# Patient Record
Sex: FEMALE | Race: WHITE | Employment: UNEMPLOYED | URBAN - NONMETROPOLITAN AREA
[De-identification: names, ages, dates, MRNs, and addresses within clinical notes are randomized per-mention and may not be internally consistent; named-entity substitution may affect disease eponyms.]

---

## 2017-12-09 ENCOUNTER — OFFICE VISIT (OUTPATIENT)
Dept: URGENT CARE | Age: 57
End: 2017-12-09

## 2017-12-09 VITALS
SYSTOLIC BLOOD PRESSURE: 137 MMHG | BODY MASS INDEX: 23.22 KG/M2 | HEIGHT: 60 IN | OXYGEN SATURATION: 98 % | WEIGHT: 118.25 LBS | DIASTOLIC BLOOD PRESSURE: 83 MMHG | RESPIRATION RATE: 14 BRPM | HEART RATE: 68 BPM | TEMPERATURE: 98.9 F

## 2017-12-09 DIAGNOSIS — T07.XXXA MULTIPLE ABRASIONS: ICD-10-CM
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
## 2017-12-09 DIAGNOSIS — S09.93XA FACIAL INJURY, INITIAL ENCOUNTER: ICD-10-CM
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
## 2017-12-09 PROCEDURE — 99213 OFFICE O/P EST LOW 20 MIN: CPT | Performed by: NURSE PRACTITIONER

## 2017-12-09 ASSESSMENT — ENCOUNTER SYMPTOMS
ALLERGIC/IMMUNOLOGIC NEGATIVE: 1
EYES NEGATIVE: 1
RESPIRATORY NEGATIVE: 1
GASTROINTESTINAL NEGATIVE: 1

## 2017-12-09 NOTE — PATIENT INSTRUCTIONS
Patient Education        Scrapes (Abrasions): Care Instructions  Your Care Instructions  Scrapes (abrasions) are wounds where your skin has been rubbed or torn off. Most scrapes do not go deep into the skin, but some may remove several layers of skin. Scrapes usually don't bleed much, but they may ooze pinkish fluid. Scrapes on the head or face may appear worse than they are. They may bleed a lot because of the good blood supply to this area. Most scrapes heal well and may not need a bandage. They usually heal within 3 to 7 days. A large, deep scrape may take 1 to 2 weeks or longer to heal. A scab may form on some scrapes. Follow-up care is a key part of your treatment and safety. Be sure to make and go to all appointments, and call your doctor if you are having problems. It's also a good idea to know your test results and keep a list of the medicines you take. How can you care for yourself at home? · If your doctor told you how to care for your wound, follow your doctor's instructions. If you did not get instructions, follow this general advice:  ¨ Wash the scrape with clean water 2 times a day. Don't use hydrogen peroxide or alcohol, which can slow healing. ¨ You may cover the scrape with a thin layer of petroleum jelly, such as Vaseline, and a nonstick bandage. ¨ Apply more petroleum jelly and replace the bandage as needed. · Prop up the injured area on a pillow anytime you sit or lie down during the next 3 days. Try to keep it above the level of your heart. This will help reduce swelling. · Be safe with medicines. Take pain medicines exactly as directed. ¨ If the doctor gave you a prescription medicine for pain, take it as prescribed. ¨ If you are not taking a prescription pain medicine, ask your doctor if you can take an over-the-counter medicine. When should you call for help?   Call your doctor now or seek immediate medical care if:  ? · You have signs of infection, such as:  ¨ Increased pain, swelling, warmth, or redness around the scrape. ¨ Red streaks leading from the scrape. ¨ Pus draining from the scrape. ¨ A fever. ? · The scrape starts to bleed, and blood soaks through the bandage. Oozing small amounts of blood is normal.   ? Watch closely for changes in your health, and be sure to contact your doctor if the scrape is not getting better each day. Where can you learn more? Go to https://Messagemind.[employer]. org and sign in to your [employer] account. Enter A374 in the Com[employer]ence box to learn more about \"Scrapes (Abrasions): Care Instructions. \"     If you do not have an account, please click on the \"Sign Up Now\" link. Current as of: March 20, 2017  Content Version: 11.4  © 1689-6741 [employer]. Care instructions adapted under license by [facility] ([facility]). If you have questions about a medical condition or this instruction, always ask your healthcare professional. [place] [address] any warranty or liability for your use of this information.      Advised to apply ice to face for 10-15 minutes 3 to 4 times a day  Cleanse face twice daily and apply OTC Triple antibiotic ointment twice daily  Tylenol or Motrin for fever or discomfort  Follow up or see PCP for any increased swelling or nasal congestion

## 2017-12-09 NOTE — PROGRESS NOTES
[facility] CARE  [address] [place] [address] Agustin Benedict [address]  Dept: [phone]  Loc: [phone]    Urvashi Iniguez is a 62 y.o. female who presents today for her medical conditions/complaints as noted below. Urvashi Iniguez is c/o of Facial Injury (fell earlier today and scraped face)    She presents for facial abrasions after falling at home earlier today. States she was running back into the house and tripped on something and fell on her face. She states she cleaned her face and right hand/thumb and applied some Silver herbal medicine. She also states she took Tylenol about 1230pm today. She denies any nasal or face pain or nasal congestion. HPI:     HPI    No past medical history on file. Past Surgical History:   Procedure Laterality Date    ANKLE SURGERY      left       Family History   Problem Relation Age of Onset    Other Mother      RA       Social History   Substance Use Topics    Smoking status: Never Smoker    Smokeless tobacco: Never Used    Alcohol use No      Current Outpatient Prescriptions   Medication Sig Dispense Refill    Multiple Vitamins-Minerals (THERAPEUTIC MULTIVITAMIN-MINERALS) tablet Take 1 tablet by mouth daily       No current facility-administered medications for this visit. No Known Allergies    Health Maintenance   Topic Date Due    DTaP/Tdap/Td vaccine (1 - Tdap) 09/30/1979    Colon cancer screen colonoscopy  09/30/2010    Flu vaccine (1) 09/01/2017    Breast cancer screen  05/05/2018    Cervical cancer screen  05/03/2019    Lipid screen  05/06/2021    Hepatitis C screen  Addressed    HIV screen  Addressed       Subjective:      Review of Systems   Constitutional: Negative. HENT: Negative. Eyes: Negative. Respiratory: Negative. Cardiovascular: Negative. Gastrointestinal: Negative. Endocrine: Negative. Genitourinary: Negative. Musculoskeletal: Negative.     Skin: Positive for wound (face and right hand). Allergic/Immunologic: Negative. Neurological: Negative. Hematological: Negative. Psychiatric/Behavioral: Negative. Objective:     Physical Exam   Constitutional: She is oriented to person, place, and time. She appears well-developed and well-nourished. No distress. HENT:   Head: Normocephalic. Eyes: Right eye exhibits no discharge. Left eye exhibits no discharge. Neck: Normal range of motion. Neck supple. Pulmonary/Chest: Effort normal. She has no wheezes. Musculoskeletal: Normal range of motion. Neurological: She is alert and oriented to person, place, and time. Skin: Skin is warm. Abrasion (to right face x 4 and right hand/thumb) and ecchymosis (slight to nasal bridge) noted. No laceration noted. Rash: to abrasion areas. She is not diaphoretic. There is erythema. Psychiatric: She has a normal mood and affect. Her behavior is normal. Judgment and thought content normal.   Nursing note and vitals reviewed. /83   Pulse 68   Temp 98.9 °F (37.2 °C) (Temporal)   Resp 14   Ht 5' (1.524 m)   Wt 118 lb 4 oz (53.6 kg)   LMP 08/07/2013   SpO2 98%   BMI 23.09 kg/m²     Assessment:      1. Fall in home, initial encounter     2. Multiple abrasions     3. Facial injury, initial encounter         Plan:    No orders of the defined types were placed in this encounter. No Follow-up on file. No orders of the defined types were placed in this encounter. No orders of the defined types were placed in this encounter. Patient given educational materials - see patient instructions. Discussed use, benefit, and side effects of prescribed medications. All patient questions answered. Pt voiced understanding. Reviewed health maintenance. Instructed to continue current medications, diet and exercise. Patient agreed with treatment plan. Follow up as directed.      Patient Instructions       Patient Education        Scrapes (Abrasions): Care Instructions  Your Care Instructions  Scrapes (abrasions) are wounds where your skin has been rubbed or torn off. Most scrapes do not go deep into the skin, but some may remove several layers of skin. Scrapes usually don't bleed much, but they may ooze pinkish fluid. Scrapes on the head or face may appear worse than they are. They may bleed a lot because of the good blood supply to this area. Most scrapes heal well and may not need a bandage. They usually heal within 3 to 7 days. A large, deep scrape may take 1 to 2 weeks or longer to heal. A scab may form on some scrapes. Follow-up care is a key part of your treatment and safety. Be sure to make and go to all appointments, and call your doctor if you are having problems. It's also a good idea to know your test results and keep a list of the medicines you take. How can you care for yourself at home? · If your doctor told you how to care for your wound, follow your doctor's instructions. If you did not get instructions, follow this general advice:  ¨ Wash the scrape with clean water 2 times a day. Don't use hydrogen peroxide or alcohol, which can slow healing. ¨ You may cover the scrape with a thin layer of petroleum jelly, such as Vaseline, and a nonstick bandage. ¨ Apply more petroleum jelly and replace the bandage as needed. · Prop up the injured area on a pillow anytime you sit or lie down during the next 3 days. Try to keep it above the level of your heart. This will help reduce swelling. · Be safe with medicines. Take pain medicines exactly as directed. ¨ If the doctor gave you a prescription medicine for pain, take it as prescribed. ¨ If you are not taking a prescription pain medicine, ask your doctor if you can take an over-the-counter medicine. When should you call for help? Call your doctor now or seek immediate medical care if:  ? · You have signs of infection, such as:  ¨ Increased pain, swelling, warmth, or redness around the scrape.   ¨ Red streaks leading from the scrape. ¨ Pus draining from the scrape. ¨ A fever. ? · The scrape starts to bleed, and blood soaks through the bandage. Oozing small amounts of blood is normal.   ? Watch closely for changes in your health, and be sure to contact your doctor if the scrape is not getting better each day. Where can you learn more? Go to https://Kizoom.[employer]. org and sign in to your [employer] account. Enter A374 in the [facility] box to learn more about \"Scrapes (Abrasions): Care Instructions. \"     If you do not have an account, please click on the \"Sign Up Now\" link. Current as of: March 20, 2017  Content Version: 11.4  © 8502-8104 [email or web address]. Care instructions adapted under license by [facility] ([facility]). If you have questions about a medical condition or this instruction, always ask your healthcare professional. [place] [address] any warranty or liability for your use of this information.      Advised to apply ice to face for 10-15 minutes 3 to 4 times a day  Cleanse face twice daily and apply OTC Triple antibiotic ointment twice daily  Tylenol or Motrin for fever or discomfort  Follow up or see PCP for any increased swelling or nasal congestion        Electronically signed by LYNNE George on 12/9/2017 at 2:01 PM

## 2018-03-15 ENCOUNTER — TELEPHONE (OUTPATIENT)
Dept: PRIMARY CARE CLINIC | Age: 58
End: 2018-03-15

## 2018-03-15 NOTE — TELEPHONE ENCOUNTER
Patient called asking about hormone testing. I told her that we could do some hormone labs here but that she would probably need to come in for an office visit. Patient says that she does not have insurance now and really just wants to have labs drawn. She wanted to know if we could just get the labs done?

## 2018-03-15 NOTE — TELEPHONE ENCOUNTER
If she will go to [address] [place] [address] pharmacy they can do the hormone testing for her.  I cannot order it here in the office because I have not seen her in 2 years and it will cost more here than there

## 2018-03-16 NOTE — TELEPHONE ENCOUNTER
Called patient back to let her know that she could get that hormone testing at [facility] or [facility].

## 2018-04-18 ENCOUNTER — OFFICE VISIT (OUTPATIENT)
Dept: PRIMARY CARE CLINIC | Age: 58
End: 2018-04-18

## 2018-04-18 VITALS
SYSTOLIC BLOOD PRESSURE: 118 MMHG | TEMPERATURE: 97.9 F | OXYGEN SATURATION: 98 % | BODY MASS INDEX: 23.44 KG/M2 | WEIGHT: 119.38 LBS | DIASTOLIC BLOOD PRESSURE: 64 MMHG | HEIGHT: 60 IN | HEART RATE: 70 BPM

## 2018-04-18 DIAGNOSIS — Z12.11 SCREEN FOR COLON CANCER: ICD-10-CM

## 2018-04-18 DIAGNOSIS — Z01.419 WELL FEMALE EXAM WITH ROUTINE GYNECOLOGICAL EXAM: Primary | ICD-10-CM

## 2018-04-18 PROCEDURE — 99396 PREV VISIT EST AGE 40-64: CPT | Performed by: FAMILY MEDICINE

## 2018-04-18 ASSESSMENT — PATIENT HEALTH QUESTIONNAIRE - PHQ9
SUM OF ALL RESPONSES TO PHQ QUESTIONS 1-9: 0
2. FEELING DOWN, DEPRESSED OR HOPELESS: 0
1. LITTLE INTEREST OR PLEASURE IN DOING THINGS: 0
SUM OF ALL RESPONSES TO PHQ9 QUESTIONS 1 & 2: 0

## 2018-04-20 ENCOUNTER — TELEPHONE (OUTPATIENT)
Dept: PRIMARY CARE CLINIC | Age: 58
End: 2018-04-20

## 2018-04-27 DIAGNOSIS — Z12.11 SCREEN FOR COLON CANCER: ICD-10-CM

## 2018-04-27 LAB
CONTROL: NORMAL
HEMOCCULT STL QL: NORMAL

## 2018-04-27 PROCEDURE — 82274 ASSAY TEST FOR BLOOD FECAL: CPT | Performed by: FAMILY MEDICINE

## 2019-10-01 ENCOUNTER — TELEPHONE (OUTPATIENT)
Dept: PRIMARY CARE CLINIC | Age: 59
End: 2019-10-01

## 2019-10-02 ENCOUNTER — OFFICE VISIT (OUTPATIENT)
Dept: PRIMARY CARE CLINIC | Age: 59
End: 2019-10-02

## 2019-10-02 VITALS
HEIGHT: 60 IN | TEMPERATURE: 97.8 F | RESPIRATION RATE: 18 BRPM | HEART RATE: 82 BPM | OXYGEN SATURATION: 98 % | WEIGHT: 119.4 LBS | BODY MASS INDEX: 23.44 KG/M2

## 2019-10-02 DIAGNOSIS — L08.9 INFECTED EPIDERMOID CYST: Primary | ICD-10-CM

## 2019-10-02 DIAGNOSIS — L72.0 INFECTED EPIDERMOID CYST: Primary | ICD-10-CM

## 2019-10-02 PROCEDURE — 10160 PNXR ASPIR ABSC HMTMA BULLA: CPT | Performed by: FAMILY MEDICINE

## 2019-10-02 ASSESSMENT — ENCOUNTER SYMPTOMS: RESPIRATORY NEGATIVE: 1

## 2019-10-02 ASSESSMENT — PATIENT HEALTH QUESTIONNAIRE - PHQ9
SUM OF ALL RESPONSES TO PHQ QUESTIONS 1-9: 0
1. LITTLE INTEREST OR PLEASURE IN DOING THINGS: 0
2. FEELING DOWN, DEPRESSED OR HOPELESS: 0
SUM OF ALL RESPONSES TO PHQ QUESTIONS 1-9: 0
SUM OF ALL RESPONSES TO PHQ9 QUESTIONS 1 & 2: 0

## 2020-01-17 ENCOUNTER — TELEPHONE (OUTPATIENT)
Dept: PRIMARY CARE CLINIC | Age: 60
End: 2020-01-17

## 2020-01-17 NOTE — TELEPHONE ENCOUNTER
Pt called and stated that she thinks she needs to see a Vascular doctor and was wanting a referral, I called pt back and let her know that she would need to see her PCP and possibly set up some type of test for her to have before seeing a vascular doctor I transferred pt to [facility] to set up appointment with her PCP.

## 2020-11-30 ENCOUNTER — TELEPHONE (OUTPATIENT)
Dept: PRIMARY CARE CLINIC | Age: 60
End: 2020-11-30

## 2022-05-23 ENCOUNTER — OFFICE VISIT (OUTPATIENT)
Dept: PRIMARY CARE CLINIC | Age: 62
End: 2022-05-23

## 2022-05-23 VITALS
RESPIRATION RATE: 18 BRPM | HEIGHT: 59 IN | SYSTOLIC BLOOD PRESSURE: 120 MMHG | TEMPERATURE: 97.6 F | HEART RATE: 74 BPM | DIASTOLIC BLOOD PRESSURE: 72 MMHG | WEIGHT: 110.8 LBS | BODY MASS INDEX: 22.34 KG/M2 | OXYGEN SATURATION: 99 %

## 2022-05-23 DIAGNOSIS — Z12.4 ENCOUNTER FOR PAPANICOLAOU SMEAR FOR CERVICAL CANCER SCREENING: Primary | ICD-10-CM

## 2022-05-23 PROCEDURE — 99213 OFFICE O/P EST LOW 20 MIN: CPT | Performed by: FAMILY MEDICINE

## 2022-05-23 ASSESSMENT — PATIENT HEALTH QUESTIONNAIRE - PHQ9
1. LITTLE INTEREST OR PLEASURE IN DOING THINGS: 0
SUM OF ALL RESPONSES TO PHQ QUESTIONS 1-9: 0
2. FEELING DOWN, DEPRESSED OR HOPELESS: 0
SUM OF ALL RESPONSES TO PHQ QUESTIONS 1-9: 0
SUM OF ALL RESPONSES TO PHQ9 QUESTIONS 1 & 2: 0

## 2022-05-23 NOTE — PROGRESS NOTES
SUBJECTIVE:   64 y.o. female for annual routine Pap and checkup. She does not want a physical.  She just needs a Pap smear. She denies any vaginal bleeding. She has already had a mammogram.    Overall she feels good. She tries to exercise and keep her weight down. She is a  AB 0 living children 2. Her daughter will be a senior at [employer] this year. She is having no problems. Occasionally she will have some urgency with urination. Her mother is 80. Her father is . LMP: Patient's last menstrual period was 2013. There are no problems to display for this patient. Current Outpatient Medications   Medication Sig Dispense Refill    Multiple Vitamins-Minerals (THERAPEUTIC MULTIVITAMIN-MINERALS) tablet Take 1 tablet by mouth daily       No current facility-administered medications for this visit. History reviewed. No pertinent past medical history. Past Surgical History:   Procedure Laterality Date    ANKLE SURGERY      left     Family History   Problem Relation Age of Onset    Other Mother         RA     Social History     Tobacco Use    Smoking status: Never Smoker    Smokeless tobacco: Never Used   Substance Use Topics    Alcohol use: No       Allergies: Cat hair extract    ROS:  Feeling well. No dyspnea or chest pain on exertion. No abdominal pain, change in bowel habits, black or bloody stools. No urinary tract symptoms. GYN ROS: none   No neurological complaints. All other systems negative. OBJECTIVE:   The patient appears well, alert, oriented x 3, in no distress. /72   Pulse 74   Temp 97.6 °F (36.4 °C)   Resp 18   Ht 4' 11.45\" (1.51 m)   Wt 110 lb 12.8 oz (50.3 kg)   LMP 2013   SpO2 99%   BMI 22.04 kg/m²   Skin normal, no suspicious skin lesions. ENT normal.  Neck supple. No adenopathy or thyromegaly. FRANCES. Lungs are clear, good air entry, no wheezes, rhonchi or rales. S1 and S2 normal, no murmurs, regular rate and rhythm. Abdomen soft without tenderness, guarding, mass or organomegaly. Extremities show no edema, normal peripheral pulses. Neurological is normal, no focal findings. Psychiatric exam, no signs of depression. BREAST EXAM: Breasts symmetrical. No skin lesions. Nipples appear normal without discharge. No masses or axillary lymphadenopathy. No tenderness. PELVIC EXAM: External genitalia appear normal.  Vaginal vault is slightly atrophic. Small endocervical polyp at 5:00 in the cervical os. Pap smear done. No bleeding. No cervical motion tenderness. Uterus not enlarged. I could not palpate ovaries. ASSESSMENT:  1. Encounter for Papanicolaou smear for cervical cancer screening         PLAN:   Lifestyle advice: discussed diet and exercise    1. Encounter for Papanicolaou smear for cervical cancer screening     We do need to get a copy of her mammogram.  I encouraged her to stay active. We will contact her as soon as possible with results. Schedule physical or Pap in 1 year. She does not have insurance so she did not want a lot of extra things. She denies any bright red blood per rectum or dark tarry stools. Follow-up:  Return in about 1 year (around 5/23/2023) for PE. PATIENT INSTRUCTIONS:  Patient Instructions   We are committed to providing you with the best care possible. In order to help us achieve these goals please remember to bring all medications, herbal products, and over the counter supplements with you to each visit. If your provider has ordered testing for you, please be sure to follow up with our office if you have not received results within 7 days after the testing took place. *If you receive a survey after visiting one of our offices, please take time to share your experience concerning your physician office visit. These surveys are confidential and no health information about you is shared.   We are eager to improve for you and we are counting on your feedback to help make that happen. Patient Education        Kegel Exercises: Care Instructions  Overview     Kegel exercises strengthen muscles around the bladder. These muscles control the flow of urine. Kegel exercises are sometime called \"pelvic floor\" exercises. They can help prevent urine leakage and keep the pelvic organs inplace. Kegel exercises can strengthen pelvic muscles that have been weakened by age, pregnancy, childbirth, and surgery. They may help prevent or treat urineleakage. You do Kegel exercises by tightening the muscles you use when you urinate. Mary Salvador likely need to do these exercises for several weeks to get better. Follow-up care is a key part of your treatment and safety. Be sure to make and go to all appointments, and call your doctor if you are having problems. It's also a good idea to know your test results and keep alist of the medicines you take. How can you care for yourself at home?  Do Kegel exercises. ? Find the muscles you need to strengthen. To do this, tighten the muscles that stop your urine while you are going to the bathroom. These are the same muscles you squeeze during Kegel exercises. ? Squeeze the muscles as hard as you can. Your belly and thighs should not move. ? Hold the squeeze for 3 seconds. Then relax for 3 seconds. ? Start with 3 seconds, and then add 1 second each week until you are able to squeeze for 10 seconds. ? Repeat the exercise 10 to 15 times for each session. Do three or more sessions each day.  You can check to see if you are using the right muscles. ? Tighten the muscles that help you stop passing gas or keep you from urinating. Make sure you aren't using your stomach, leg, or buttock muscles. ? Place a finger in your vagina and squeeze around it. You are doing them right when you feel pressure around your finger. Your doctor may also suggest that you put special weights in your vagina while you do the exercises.    Check with your doctor if you don't notice a difference after trying these exercises for several weeks. Your doctor may suggest getting help from a physical therapist or recommend other treatment. Where can you learn more? Go to https://SonicLivingpepiceweb.[employer]. org and sign in to your [employer] account. Enter V881 in the [facility] box to learn more about \"Kegel Exercises: Care Instructions. \"     If you do not have an account, please click on the \"Sign Up Now\" link. Current as of: October 18, 2021               Content Version: 13.2  © 4669-5346 Healthwise, [employer] Here. Care instructions adapted under license by [facility] ([facility]). If you have questions about a medical condition or this instruction, always ask your healthcare professional. [address] any warranty or liability for your use of this information. EMR Dragon/transcription disclaimer:  Much of this encounter note is electronic transcription/translation of spoken language to printed texts. The electronic translation of spoken language may be erroneous, or at times, nonsensical words or phrases may be inadvertently transcribed.   Although I have reviewed the note for such errors, some may still exist.

## 2022-05-23 NOTE — PATIENT INSTRUCTIONS
We are committed to providing you with the best care possible. In order to help us achieve these goals please remember to bring all medications, herbal products, and over the counter supplements with you to each visit. If your provider has ordered testing for you, please be sure to follow up with our office if you have not received results within 7 days after the testing took place. *If you receive a survey after visiting one of our offices, please take time to share your experience concerning your physician office visit. These surveys are confidential and no health information about you is shared. We are eager to improve for you and we are counting on your feedback to help make that happen. Patient Education        Kegel Exercises: Care Instructions  Overview     Kegel exercises strengthen muscles around the bladder. These muscles control the flow of urine. Kegel exercises are sometime called \"pelvic floor\" exercises. They can help prevent urine leakage and keep the pelvic organs inplace. Kegel exercises can strengthen pelvic muscles that have been weakened by age, pregnancy, childbirth, and surgery. They may help prevent or treat urineleakage. You do Kegel exercises by tightening the muscles you use when you urinate. Farideh Finger likely need to do these exercises for several weeks to get better. Follow-up care is a key part of your treatment and safety. Be sure to make and go to all appointments, and call your doctor if you are having problems. It's also a good idea to know your test results and keep alist of the medicines you take. How can you care for yourself at home?  Do Kegel exercises. ? Find the muscles you need to strengthen. To do this, tighten the muscles that stop your urine while you are going to the bathroom. These are the same muscles you squeeze during Kegel exercises. ? Squeeze the muscles as hard as you can. Your belly and thighs should not move. ? Hold the squeeze for 3 seconds. Then relax for 3 seconds. ? Start with 3 seconds, and then add 1 second each week until you are able to squeeze for 10 seconds. ? Repeat the exercise 10 to 15 times for each session. Do three or more sessions each day.  You can check to see if you are using the right muscles. ? Tighten the muscles that help you stop passing gas or keep you from urinating. Make sure you aren't using your stomach, leg, or buttock muscles. ? Place a finger in your vagina and squeeze around it. You are doing them right when you feel pressure around your finger. Your doctor may also suggest that you put special weights in your vagina while you do the exercises.  Check with your doctor if you don't notice a difference after trying these exercises for several weeks. Your doctor may suggest getting help from a physical therapist or recommend other treatment. Where can you learn more? Go to https://FarmetopeRNA Networkseweb.[employer]. org and sign in to your [employer] account. Enter H650 in the [employer] box to learn more about \"Kegel Exercises: Care Instructions. \"     If you do not have an account, please click on the \"Sign Up Now\" link. Current as of: October 18, 2021               Content Version: 13.2  © 2006-2022 Healthwise, Incorporated. Care instructions adapted under license by [facility]"[employer] [facility] ([facility]). If you have questions about a medical condition or this instruction, always ask your healthcare professional. [place] [address] any warranty or liability for your use of this information.

## 2022-05-27 LAB
AGE GDLN ACOG TESTING: NORMAL
CLINICAL REPORT: NORMAL
CYTOLOGY REVIEW, GYN: NORMAL
DIAGNOSIS ICD CODE: NORMAL
HB: CYTOTECHNOLT: NORMAL
HPV 16+18+31+33+35+39+45+51+52+56+58+59+68 DNA,CERVIX,PROBE: NORMAL
Lab: NORMAL
Lab: NORMAL
MICROSCOPIC OBSERVATION: NORMAL
RECOMMENDED FOLLOW UP: NORMAL
REQUEST PROBLEM: NORMAL
SPECIMEN ADEQUACY:: NORMAL

## 2023-09-06 ENCOUNTER — TRANSCRIBE ORDERS (OUTPATIENT)
Dept: ORTHOPEDIC SURGERY | Facility: CLINIC | Age: 63
End: 2023-09-06

## 2023-09-06 DIAGNOSIS — M25.521 RIGHT ELBOW PAIN: Primary | ICD-10-CM

## 2023-09-25 ENCOUNTER — OFFICE VISIT (OUTPATIENT)
Dept: ORTHOPEDIC SURGERY | Facility: CLINIC | Age: 63
End: 2023-09-25

## 2023-09-25 VITALS — HEIGHT: 60 IN | WEIGHT: 109.9 LBS | BODY MASS INDEX: 21.58 KG/M2

## 2023-09-25 DIAGNOSIS — M25.621 STIFFNESS OF RIGHT ELBOW JOINT: ICD-10-CM

## 2023-09-25 DIAGNOSIS — S42.491D CLOSED BICONDYLAR FRACTURE OF DISTAL HUMERUS, RIGHT, WITH ROUTINE HEALING, SUBSEQUENT ENCOUNTER: ICD-10-CM

## 2023-09-25 DIAGNOSIS — Z98.890 STATUS POST OPEN REDUCTION AND INTERNAL FIXATION (ORIF) OF FRACTURE: ICD-10-CM

## 2023-09-25 DIAGNOSIS — M25.421 PAIN AND SWELLING OF RIGHT ELBOW: ICD-10-CM

## 2023-09-25 DIAGNOSIS — Z87.81 STATUS POST OPEN REDUCTION AND INTERNAL FIXATION (ORIF) OF FRACTURE: ICD-10-CM

## 2023-09-25 DIAGNOSIS — M25.521 PAIN AND SWELLING OF RIGHT ELBOW: ICD-10-CM

## 2023-09-25 DIAGNOSIS — G90.511 REFLEX SYMPATHETIC DYSTROPHY OF RIGHT UPPER EXTREMITY: ICD-10-CM

## 2023-09-25 DIAGNOSIS — S52.021D CLOSED FRACTURE OF OLECRANON PROCESS OF RIGHT ULNA WITH ROUTINE HEALING, SUBSEQUENT ENCOUNTER: Primary | ICD-10-CM

## 2023-09-25 PROCEDURE — 1160F RVW MEDS BY RX/DR IN RCRD: CPT | Performed by: SPECIALIST/TECHNOLOGIST, OTHER

## 2023-09-25 PROCEDURE — 1159F MED LIST DOCD IN RCRD: CPT | Performed by: SPECIALIST/TECHNOLOGIST, OTHER

## 2023-09-25 PROCEDURE — 99204 OFFICE O/P NEW MOD 45 MIN: CPT | Performed by: SPECIALIST/TECHNOLOGIST, OTHER

## 2023-09-25 NOTE — PROGRESS NOTES
Edie Shah is a 63 y.o. female   Primary provider:  Yola Frost MD       Chief Complaint   Patient presents with    Right Elbow - Initial Evaluation     DOS 06/21/2023  DOI 06/12/2023       HISTORY OF PRESENT ILLNESS:  63-year-old female patient who is referred to see me for the first time following her surgery for her right elbow for a distal humeral fracture on 6/12/2023 that was fixed internally with plates and screws and was following up with outpatient physical therapy at [place].  Patient was operated for her right elbow injury at [facility] on 6/21/2023 following which Dr. Joe's team referred her to follow-up with us for convenience of the patient.  Patient is making slow but steady progress since injury for her right upper extremity.  Denies any new injuries.  Denies any tingling or numbness or weakness in the right upper extremity other than rest induced stiffness and features suggestive of reflux sympathetic dystrophy in her right hand.  Patient's outpatient physical therapy seems to help with her right hand symptoms.     CONCURRENT MEDICAL HISTORY:  The following portions of the patient's history were reviewed and updated as appropriate: allergies, current medications, past family history, past medical history, past social history, past surgical history and problem list.     Past Medical History:   Diagnosis Date    Fracture of ankle     Fractured elbow 06/12/2023       No Known Allergies    No current outpatient medications on file.    History reviewed. No pertinent surgical history.    History reviewed. No pertinent family history.     Social History     Socioeconomic History    Marital status:    Vaping Use    Vaping Use: Unknown   Substance and Sexual Activity    Alcohol use: Never    Drug use: Never    Sexual activity: Defer        Review of Systems   Musculoskeletal:  Positive for joint swelling.        Muscle pain   All other systems reviewed and are  "negative.    PHYSICAL EXAMINATION:       Ht 152.4 cm (60\")   Wt 49.9 kg (109 lb 14.4 oz)   BMI 21.46 kg/m²     Physical Exam    GAIT:     [x]  Normal  []  Antalgic    Assistive device: [x]  None  []  Walker     []  Crutches  []  Cane     []  Wheelchair  []  Stretcher    Ortho Exam  Right elbow exam out of the splint:  Nontender near the surgical site with incision being clean, dry, intact and well-healed.  Patient is able to flex extend her right elbow from 45 degrees short of full extension to up to 90 degrees flexion.  Pronation supination terminal restriction of 10 to 15 degrees noted on either side.  Hand function is slightly limited with rest induced stiffness and reflect sympathetic dystrophy changes however able to actively flex extend fingers and thumb with no obvious ulnar radial or median nerve involvement.  No sign of DVT/compartment soft nontender.  Brisk capillary reflexes noted.    XR Elbow 3+ View Right    Result Date: 9/30/2023  Narrative: XR Elbow 3+ View Right Result Date: 09/25/2023 CLINICAL HISTORY: 63 years Female, with h/o fall, injury and niw 3 mos post-op ORIS at [facility] COMPARISON: None. FINDINGS: Right elbow postoperative images of internal fixation with screws and plates holding the distal humeral fracture reduced in anatomical fashion along with proximal ulna plate and screws with no hardware related concerns and with interval change in the form of reasonable fracture healing at the fracture site across the transcondylar region of right distal humerus. No other acute fracture. No dislocation. No destructive or lytic lesion. Soft tissue demonstrate no acute abnormality or any heterotopic ossification at the fracture site near the elbow or in and around the hardware noted. Impression: As described above. Electronically signed by: Jose Antonio Bobo MD  9/25/2023 11.00 AM CDT Workstation: [id number]         ASSESSMENT:  63-year-old female patient who is referred to see me for the first time following " her surgery for her right elbow for a distal humeral fracture on 6/12/2023 that was fixed internally with plates and screws and was following up with outpatient physical therapy at [place].  Patient was operated for her right elbow injury at [facility] on 6/21/2023 following which Dr. Joe's team referred her to follow-up with us for convenience of the patient.  Patient is making slow but steady progress since injury for her right upper extremity.  Denies any new injuries.  Denies any tingling or numbness or weakness in the right upper extremity other than rest induced stiffness and features suggestive of reflux sympathetic dystrophy in her right hand.  Patient's outpatient physical therapy seems to help with her right hand symptoms.    Diagnoses and all orders for this visit:    Closed fracture of olecranon process of right ulna with routine healing, subsequent encounter  -     XR Elbow 3+ View Right    Closed bicondylar fracture of distal humerus, right, with routine healing, subsequent encounter    Pain and swelling of right elbow    Reflex sympathetic dystrophy of right upper extremity    Status post open reduction and internal fixation (ORIF) of fracture    Stiffness of right elbow joint      Reassurance was provided to the patient and her  in clinic today educating about further home exercise program and more than 45 minutes were spent treating the patient and educating about the therapy that she can do at home for herself and to use ice packs frequently before and after the therapy sessions at home and also to continue outpatient physical therapy dedicated to improve the right upper extremity function.  Review this patient in 6 to 8 weeks.  For a follow-up.  Patient is advised to continue active assisted ROM's for herself, ice packs, local pain gel application without massage and over-the-counter pain meds for pain relief.  The patient voiced understanding of the risks, benefits, and  alternative forms of treatment that were discussed and the patient agreed to the treatment plan.  This discussion was held with the patient by  Jose Antonio Bobo MD and all questions were answered.  EMR Dragon/Transciption Disclaimer: Some of this note may be an electronic transcription/translation of spoken language to printed text using the Dragon Dictation System.  Time spent of a minimum of 45 minutes including the face to face evaluation, reviewing of medical history and prior medial records, reviewing of diagnostic studies, documentation, patient education and coordination of care and surgical treatment decision.  PLAN    Return in about 8 weeks (around 11/20/2023) for Recheck.    Jose Antonio Bobo MD

## 2023-09-26 ENCOUNTER — TELEPHONE (OUTPATIENT)
Dept: ORTHOPEDIC SURGERY | Facility: CLINIC | Age: 63
End: 2023-09-26

## 2023-09-26 NOTE — TELEPHONE ENCOUNTER
DAMON    Patient called and asked if she needed to continue physical therapy? And if so what exercises does she need to work on during physical therapy?

## 2023-09-30 PROBLEM — M25.621 STIFFNESS OF RIGHT ELBOW JOINT: Status: ACTIVE | Noted: 2023-09-30

## 2023-09-30 PROBLEM — S42.491D: Status: ACTIVE | Noted: 2023-09-30

## 2023-09-30 PROBLEM — S52.021A CLOSED FRACTURE OF RIGHT OLECRANON PROCESS: Status: ACTIVE | Noted: 2023-09-30

## 2023-09-30 PROBLEM — M25.521 PAIN AND SWELLING OF RIGHT ELBOW: Status: ACTIVE | Noted: 2023-09-30

## 2023-09-30 PROBLEM — Z98.890 STATUS POST OPEN REDUCTION AND INTERNAL FIXATION (ORIF) OF FRACTURE: Status: ACTIVE | Noted: 2023-09-30

## 2023-09-30 PROBLEM — Z87.81 STATUS POST OPEN REDUCTION AND INTERNAL FIXATION (ORIF) OF FRACTURE: Status: ACTIVE | Noted: 2023-09-30

## 2023-09-30 PROBLEM — M25.421 PAIN AND SWELLING OF RIGHT ELBOW: Status: ACTIVE | Noted: 2023-09-30

## 2023-09-30 PROBLEM — G90.511 REFLEX SYMPATHETIC DYSTROPHY OF RIGHT UPPER EXTREMITY: Status: ACTIVE | Noted: 2023-09-30

## 2023-11-27 ENCOUNTER — TRANSCRIBE ORDERS (OUTPATIENT)
Dept: ADMINISTRATIVE | Facility: HOSPITAL | Age: 63
End: 2023-11-27

## 2023-11-27 DIAGNOSIS — S42.491D OTHER DISPLACED FRACTURE OF LOWER END OF RIGHT HUMERUS, SUBSEQUENT ENCOUNTER FOR FRACTURE WITH ROUTINE HEALING: Primary | ICD-10-CM

## 2023-12-13 ENCOUNTER — TRANSCRIBE ORDERS (OUTPATIENT)
Dept: ADMINISTRATIVE | Age: 63
End: 2023-12-13

## 2023-12-13 DIAGNOSIS — S42.491D OPEN BICONDYLAR FRACTURE OF DISTAL HUMERUS, RIGHT, WITH ROUTINE HEALING, SUBSEQUENT ENCOUNTER: ICD-10-CM

## 2023-12-13 DIAGNOSIS — R29.898 HAND WEAKNESS: Primary | ICD-10-CM

## 2023-12-26 ENCOUNTER — HOSPITAL ENCOUNTER (OUTPATIENT)
Dept: NEUROLOGY | Age: 63
Discharge: HOME | End: 2023-12-26
Attending: ORTHOPAEDIC SURGERY

## 2023-12-26 DIAGNOSIS — R29.898 HAND WEAKNESS: ICD-10-CM

## 2023-12-26 DIAGNOSIS — S42.491D OPEN BICONDYLAR FRACTURE OF DISTAL HUMERUS, RIGHT, WITH ROUTINE HEALING, SUBSEQUENT ENCOUNTER: ICD-10-CM

## 2023-12-26 PROCEDURE — 95886 MUSC TEST DONE W/N TEST COMP: CPT

## 2023-12-26 PROCEDURE — 95911 NRV CNDJ TEST 9-10 STUDIES: CPT

## 2023-12-26 NOTE — PROCEDURES
[employer] [address]                             ELECTROMYOGRAM REPORT    PATIENT NAME: Scot Grimaldo                    :        1960  MED REC NO:   [id number]                              ROOM:  ACCOUNT NO:   [de-identified]                           ADMIT DATE: 2023  PROVIDER:     Jan Aquino MD    DATE OF EM2023    EMG/NERVE STUDY BILATERAL UPPER EXTREMITIES    INDICATION FOR TEST:  Right elbow fracture with weakness and numbness in  the right hand. SUMMARY:  Nerve conduction studies of the right upper extremity showed a  prolonged median and ulnar SNAPs as well as prolonged median and ulnar  motor latencies. The median motor response is also low in amplitude  with a slow conduction velocity. A comparison was made in the left  upper extremity which revealed no abnormalities on that side, except for  a low-amplitude median motor response. Needle exam of the right upper extremity showed 2+ fibs and positive  waves in the flexor pollicis longus with some reduced recruitment in the  APB. IMPRESSION:  1. 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 There are a few  distant motor units still recruited. 2.  Moderately severe right carpal tunnel syndrome. 3.  Mild ulnar nerve lesion at or about the wrist.  Correlate  clinically.         Jan Aquino MD    D: 2023 11:49:46      T: 2023 11:54:41     [email or web address]  Job#: [id number]     Doc#: [id number]    CC:

## 2024-01-12 ENCOUNTER — TELEPHONE (OUTPATIENT)
Dept: NEUROLOGY | Facility: HOSPITAL | Age: 64
End: 2024-01-12

## 2024-01-12 NOTE — TELEPHONE ENCOUNTER
Hello,  This is [facility] [place], try to reach out to confirm your appointment for EMG/NCV, tomorrow 1/15/2024, at 8:00 am. We ask that you get here at 7:45 to register. When you get here you will go to medical [address]. You will see the brown security stand behind it, there will be a small set of 3 steps. You go up the 3 steps go straight and we are the 2nd door on the left.  If you have any question give us a call at [phone].  Thank You  [facility]  [facility]

## 2024-01-15 ENCOUNTER — TELEPHONE (OUTPATIENT)
Dept: NEUROLOGY | Facility: HOSPITAL | Age: 64
End: 2024-01-15

## 2024-01-29 ENCOUNTER — TELEPHONE (OUTPATIENT)
Dept: PRIMARY CARE CLINIC | Age: 64
End: 2024-01-29

## 2024-01-29 DIAGNOSIS — G56.00 CARPAL TUNNEL SYNDROME, UNSPECIFIED LATERALITY: Primary | ICD-10-CM

## 2024-02-06 ENCOUNTER — TELEPHONE (OUTPATIENT)
Dept: NEUROSURGERY | Age: 64
End: 2024-02-06

## 2024-02-07 NOTE — TELEPHONE ENCOUNTER
Called patient back about rescheduling her appointment. Patient was wanting something sooner. I apologized that right now that was the soonest. If we get a cancellation we could get her in sooner.   Patient voiced understanding.

## 2024-02-14 ENCOUNTER — OFFICE VISIT (OUTPATIENT)
Dept: NEUROSURGERY | Age: 64
End: 2024-02-14

## 2024-02-14 VITALS
BODY MASS INDEX: 21.99 KG/M2 | WEIGHT: 112 LBS | DIASTOLIC BLOOD PRESSURE: 80 MMHG | HEART RATE: 70 BPM | SYSTOLIC BLOOD PRESSURE: 132 MMHG | HEIGHT: 60 IN

## 2024-02-14 DIAGNOSIS — R20.2 NUMBNESS AND TINGLING IN RIGHT HAND: ICD-10-CM

## 2024-02-14 DIAGNOSIS — G56.11 RIGHT MEDIAN NERVE NEUROPATHY: Primary | ICD-10-CM

## 2024-02-14 DIAGNOSIS — R20.0 NUMBNESS AND TINGLING IN RIGHT HAND: ICD-10-CM

## 2024-02-14 DIAGNOSIS — R29.898 HAND WEAKNESS: ICD-10-CM

## 2024-02-14 PROCEDURE — 99204 OFFICE O/P NEW MOD 45 MIN: CPT | Performed by: NEUROLOGICAL SURGERY

## 2024-02-14 RX ORDER — GABAPENTIN 300 MG/1
300 CAPSULE ORAL 3 TIMES DAILY
COMMUNITY
Start: 2023-07-21

## 2024-02-14 NOTE — PROGRESS NOTES
Review of Systems   Constitutional: Negative.    HENT: Negative.     Eyes: Negative.    Respiratory: Negative.     Cardiovascular: Negative.    Gastrointestinal: Negative.    Genitourinary: Negative.    Musculoskeletal:  Positive for joint pain and myalgias.   Skin: Negative.    Neurological:  Positive for tingling and weakness.   Endo/Heme/Allergies: Negative.    Psychiatric/Behavioral: Negative.        
fingers  Left Upper Extremity: normal light touch sensation  Right Lower Extremity: normal light touch sensation  Left Lower Extremity: normal light touch sensation      REFLEXES:    Biceps: 2+  Patella: 2+      Hernandez's: Negative      COORDINATION and GAIT:    Gait: Normal      PROVOCATIVE TESTS:    Tinel's:  Positive at the right elbow, negative at the right wrist      IMAGING:    My interpretation of imaging studies: No relevant imaging          ASSESSMENT AND PLAN:      This is a 63 y.o. female who presents for a neurosurgical second opinion regarding numbness and weakness in her right hand that has developed following a significant right elbow trauma/fracture that required operative repair ~7 months ago.  She has median-distribution numbness and weakness in her right hand and an EMG/NCS that reveals possible carpal tunnel syndrome as well as a possible anterior interosseous nerve syndrome.  While her history would certainly imply pathology at the elbow is causing her symptoms, it is also possible that the postoperative swelling in her hand after her surgery did result in some median nerve entrapment at the wrist that is contributing.  Given the low-risk nature of a carpal tunnel release surgery, I advised her that it was reasonable to proceed with that.  If she does not see significant improvement afterwards, I believe she would benefit from evaluation at a tertiary care center such as there [facility] at [facility] in [place] for further treatment.            Dc Preciado MD

## 2025-08-11 ENCOUNTER — OFFICE VISIT (OUTPATIENT)
Age: 65
End: 2025-08-11

## 2025-08-11 VITALS — HEIGHT: 60 IN | BODY MASS INDEX: 22.85 KG/M2 | WEIGHT: 116.4 LBS

## 2025-08-11 DIAGNOSIS — G89.29 ELBOW PAIN, CHRONIC, RIGHT: Primary | ICD-10-CM

## 2025-08-11 DIAGNOSIS — Z96.9 RETAINED ORTHOPEDIC HARDWARE: ICD-10-CM

## 2025-08-11 DIAGNOSIS — M25.521 ELBOW PAIN, CHRONIC, RIGHT: Primary | ICD-10-CM

## 2025-08-11 PROCEDURE — 99214 OFFICE O/P EST MOD 30 MIN: CPT | Performed by: ORTHOPAEDIC SURGERY
